# Patient Record
(demographics unavailable — no encounter records)

---

## 2024-11-25 NOTE — DISCUSSION/SUMMARY
[Antithrombotic therapy with ___] : antithrombotic therapy with  [unfilled] [Intensive Blood Pressure Control] : intensive blood pressure control [Lipid Lowering Therapy] : lipid lowering therapy [Patient encouraged to discuss with Primary MD] : I encouraged the patient to discuss these important issues with ~his/her~ primary care doctor [Goals and Counseling] : I have reviewed the goals of stroke risk factor modification. I counseled the patient on measures to reduce stroke risk, including the importance of medication compliance, risk factor control, exercise, healthy diet and avoidance of smoking. I reviewed stroke warning signs and symptoms and appropriate actions to take if such occur. [FreeTextEntry1] : 2024- On MRI brain, on my read I noticed extensive bilateral lacunar infarcts with extensive white matter/cerebral small vessel ischemic changes.  There are also multiple cerebral microbleed's in bilateral hemispheres more in the deep brain suggestive of hypertensive angiopathy  2017- MRA brain: No flow-limiting stenosis of the intracranial circulation. No MRA evidence of aneurysm. MRA neck: Less than 50% stenosis of the left proximal internal carotid artery as per NASCET criteria. No significant stenosis of the right common and internal carotid arteries.  Impression & Plan: 70 year old female with history of DM since 2009, no retinopathy, possible neuropathy, breast cancer s/p radiation therapy, hypertension who presents for follow up of chronic kidney disease and hypertension neurological exam is nonfocal  I have emphasized to her in very detail MRI findings that include multiple cerebral microbleed's and extensive periventricular white matter disease that she has to do her best to optimize her risk factors to decrease the risk of recurrent stroke and progression of small vessel cerebrovascular disease.

## 2024-11-25 NOTE — HISTORY OF PRESENT ILLNESS
[FreeTextEntry1] : Ms. Boyd is a 70-year-old woman, here for vascular neurology consultation. In 2017 she had a right MCA territory subacute infarct with - Left hemiparesis; left facial droop and slurred speech   No active / new clinical symptoms of new cerebrovascular symptoms.   Neurological ROS - negative except for HA

## 2024-11-25 NOTE — PHYSICAL EXAM
[General Appearance - Alert] : alert [General Appearance - In No Acute Distress] : in no acute distress [Oriented To Time, Place, And Person] : oriented to person, place, and time [Impaired Insight] : insight and judgment were intact [Affect] : the affect was normal [Person] : oriented to person [Place] : oriented to place [Time] : oriented to time [Concentration Intact] : normal concentrating ability [Visual Intact] : visual attention was ~T not ~L decreased [Naming Objects] : no difficulty naming common objects [Repeating Phrases] : no difficulty repeating a phrase [Writing A Sentence] : no difficulty writing a sentence [Fluency] : fluency intact [Comprehension] : comprehension intact [Reading] : reading intact [Past History] : adequate knowledge of personal past history [Cranial Nerves Optic (II)] : visual acuity intact bilaterally,  visual fields full to confrontation, pupils equal round and reactive to light [Cranial Nerves Oculomotor (III)] : extraocular motion intact [Cranial Nerves Trigeminal (V)] : facial sensation intact symmetrically [Cranial Nerves Facial (VII)] : face symmetrical [Cranial Nerves Vestibulocochlear (VIII)] : hearing was intact bilaterally [Cranial Nerves Glossopharyngeal (IX)] : tongue and palate midline [Cranial Nerves Accessory (XI - Cranial And Spinal)] : head turning and shoulder shrug symmetric [Cranial Nerves Hypoglossal (XII)] : there was no tongue deviation with protrusion [Motor Tone] : muscle tone was normal in all four extremities [Motor Strength] : muscle strength was normal in all four extremities [No Muscle Atrophy] : normal bulk in all four extremities [Sensation Tactile Decrease] : light touch was intact [Abnormal Walk] : normal gait [Balance] : balance was intact [2+] : Ankle jerk left 2+ [Sclera] : the sclera and conjunctiva were normal [PERRL With Normal Accommodation] : pupils were equal in size, round, reactive to light, with normal accommodation [Extraocular Movements] : extraocular movements were intact [Past-pointing] : there was no past-pointing [Tremor] : no tremor present [Plantar Reflex Right Only] : normal on the right [Plantar Reflex Left Only] : normal on the left

## 2025-01-02 NOTE — HISTORY OF PRESENT ILLNESS
[de-identified] : prior evaluation of neck to r/o metastatic adenopathy from  lower eyelid skin cancer.  denies pain, swelling, new lesions. no changes medically since last visit.  still follows with neurologist.  I have reviewed all old and new data and available images.

## 2025-01-02 NOTE — ASSESSMENT
[FreeTextEntry1] : will observe. no indication for any studies. to return earlier if any change. patient has been given the opportunity to ask questions, and all of the patient's questions have been answered to their satisfaction

## 2025-01-02 NOTE — PHYSICAL EXAM
[de-identified] : no palpable thyroid nodules [Laryngoscopy Performed] : laryngoscopy was performed, see procedure section for findings [Midline] : located in midline position [Normal] : orientation to person, place, and time: normal [de-identified] : well healed scar

## 2025-01-07 NOTE — PHYSICAL EXAM
[No Acute Distress] : no acute distress [Well Nourished] : well nourished [Well Developed] : well developed [Well-Appearing] : well-appearing [Normal Voice/Communication] : normal voice/communication [Normal Sclera/Conjunctiva] : normal sclera/conjunctiva [PERRL] : pupils equal round and reactive to light [EOMI] : extraocular movements intact [Normal Outer Ear/Nose] : the outer ears and nose were normal in appearance [Normal Oropharynx] : the oropharynx was normal [No JVD] : no jugular venous distention [Supple] : supple [No Respiratory Distress] : no respiratory distress  [No Accessory Muscle Use] : no accessory muscle use [Clear to Auscultation] : lungs were clear to auscultation bilaterally [Normal Rate] : normal rate  [Regular Rhythm] : with a regular rhythm [Normal S1, S2] : normal S1 and S2 [No Carotid Bruits] : no carotid bruits [No Edema] : there was no peripheral edema [No Extremity Clubbing/Cyanosis] : no extremity clubbing/cyanosis [Declined Breast Exam] : declined breast exam  [Soft] : abdomen soft [Normal Bowel Sounds] : normal bowel sounds [Declined Rectal Exam] : declined rectal exam [No CVA Tenderness] : no CVA  tenderness [No Spinal Tenderness] : no spinal tenderness [No Rash] : no rash [No Focal Deficits] : no focal deficits [Speech Grossly Normal] : speech grossly normal [Normal Affect] : the affect was normal [Alert and Oriented x3] : oriented to person, place, and time [de-identified] : No sinus tenderness [de-identified] : Thyromegaly; no stridor [de-identified] : R = 16 [de-identified] : Normal bilateral radial pulses; no cords [de-identified] : Declined [de-identified] : Ambulating with cane

## 2025-01-07 NOTE — REVIEW OF SYSTEMS
0 [Vision Problems] : vision problems [Joint Pain] : joint pain [Joint Stiffness] : joint stiffness [Back Pain] : back pain [Mole Changes] : mole changes [Skin Rash] : skin rash [Negative] : Heme/Lymph

## 2025-01-07 NOTE — HEALTH RISK ASSESSMENT
[Yes] : Yes [No] : In the past 12 months have you used drugs other than those required for medical reasons? No [No falls in past year] : Patient reported no falls in the past year [Patient refused screening] : Patient refused screening [With Patient/Caregiver] : , with patient/caregiver [Reviewed no changes] : Reviewed, no changes [Designated Healthcare Proxy] : Designated healthcare proxy [Name: ___] : Health Care Proxy's Name: [unfilled]  [Relationship: ___] : Relationship: [unfilled] [Former] : Former [de-identified] : Head and neck surgery, neurology, cardiology, ophthalmology, nephrology, oncology [de-identified] : None presently [de-identified] : Regular [AdvancecareDate] : 01/25

## 2025-01-07 NOTE — ASSESSMENT
[FreeTextEntry1] : Type 2 diabetes Has been well-controlled Endocrine follow-up Continue medications as per endocrine Continue to monitor microalbumin and A1c level Home glucose monitoring Podiatry follow-up Ophthalmology follow-up Continue weight control and ADA diet Flu vaccine given today  Hyperlipidemia Weight control/low-fat diet Continue statin therapy and daily Zetia Monitor lipid profile  Hypertension Blood pressure in fairly good range Low-sodium diet/weight control Continue losartan, amlodipine, spironolactone Nephrology follow-up  Osteoporosis Monitor bone density Off drug holiday and alendronate restarted by endocrine Weightbearing exercise as tolerated Vitamin D supplementation  Eyelid cancer Sees dermatology Sees head and neck surgery No recurrence noted  Full labs and urine testing done today She wishes to return in follow-up in 6 months for her annual physical and as needed She states that she will call me if her status changes or worsens or for any medical issues and return to be seen immediately All of the above was discussed in detail with her and all questions were answered She verbally confirmed understanding of all of the above and agreement with the above plan

## 2025-01-07 NOTE — HISTORY OF PRESENT ILLNESS
[Family Member] : family member [FreeTextEntry1] : Comes in for routine follow-up medical visit. [de-identified] : Reports that she is doing fairly well.  Excited about celebrating her 71st birthday later this month with a family dinner at [employer].  Continues to have joint issues.  Ambulates with cane.  Keeping up with multiple doctor visits.

## 2025-01-24 NOTE — REASON FOR VISIT
[Follow - Up] : a follow-up visit [DM Type 2] : DM Type 2 [Osteoporosis] : osteoporosis [Thyroid nodule/ MNG] : thyroid nodule/ MNG [Family Member] : family member

## 2025-01-25 NOTE — QUALITY MEASURES
What Type Of Note Output Would You Prefer (Optional)?: Bullet Format How Severe Are Your Spot(S)?: moderate Have Your Spot(S) Been Treated In The Past?: has not been treated Hpi Title: Evaluation of Skin Lesions [Visual inspection, sensory exam] : Foot exam, including visual inspection, sensory exam with mono filament, and pulse exam, was performed within the last 12 months [Nephrology Follow-Up] : patient is currently receiving treatment via nephrology follow-up

## 2025-01-25 NOTE — REVIEW OF SYSTEMS
[Joint Pain] : joint pain [Negative] : Integumentary [Fatigue] : no fatigue [Recent Weight Gain (___ Lbs)] : no recent weight gain [Recent Weight Loss (___ Lbs)] : no recent weight loss [Visual Field Defect] : no visual field defect [Polyuria] : no polyuria [Pain/Numbness of Digits] : no pain/numbness of digits [Polydipsia] : no polydipsia [Cold Intolerance] : no cold intolerance [Heat Intolerance] : no heat intolerance [Swelling] : no swelling

## 2025-01-25 NOTE — PHYSICAL EXAM
[Alert] : alert [No Acute Distress] : no acute distress [Normal Sclera/Conjunctiva] : normal sclera/conjunctiva [EOMI] : extra ocular movement intact [No LAD] : no lymphadenopathy [No Respiratory Distress] : no respiratory distress [Clear to Auscultation] : lungs were clear to auscultation bilaterally [Normal S1, S2] : normal S1 and S2 [Regular Rhythm] : with a regular rhythm [No Edema] : no peripheral edema [Pedal Pulses Normal] : the pedal pulses are present [Normal Bowel Sounds] : normal bowel sounds [Not Tender] : non-tender [Not Distended] : not distended [Soft] : abdomen soft [Normal Anterior Cervical Nodes] : no anterior cervical lymphadenopathy [No Clubbing, Cyanosis] : no clubbing  or cyanosis of the fingernails [No Rash] : no rash [Normal Reflexes] : deep tendon reflexes were 2+ and symmetric [Normal Affect] : the affect was normal [Normal Mood] : the mood was normal [de-identified] : prominent gland [Thyroid Not Enlarged] : the thyroid was not enlarged [Kyphosis] : no kyphosis present [de-identified] : last foot exam in June 2024 and sees podiatry

## 2025-01-25 NOTE — PHYSICAL EXAM
[Alert] : alert [No Acute Distress] : no acute distress [Normal Sclera/Conjunctiva] : normal sclera/conjunctiva [EOMI] : extra ocular movement intact [No LAD] : no lymphadenopathy [No Respiratory Distress] : no respiratory distress [Clear to Auscultation] : lungs were clear to auscultation bilaterally [Normal S1, S2] : normal S1 and S2 [Regular Rhythm] : with a regular rhythm [No Edema] : no peripheral edema [Pedal Pulses Normal] : the pedal pulses are present [Normal Bowel Sounds] : normal bowel sounds [Not Tender] : non-tender [Not Distended] : not distended [Soft] : abdomen soft [Normal Anterior Cervical Nodes] : no anterior cervical lymphadenopathy [No Clubbing, Cyanosis] : no clubbing  or cyanosis of the fingernails [No Rash] : no rash [Normal Reflexes] : deep tendon reflexes were 2+ and symmetric [Normal Affect] : the affect was normal [Normal Mood] : the mood was normal [de-identified] : prominent gland [Thyroid Not Enlarged] : the thyroid was not enlarged [Kyphosis] : no kyphosis present [de-identified] : last foot exam in June 2024 and sees podiatry

## 2025-01-25 NOTE — HISTORY OF PRESENT ILLNESS
[FreeTextEntry1] : 71 y.o. female with h/o osteoporosis, Type 2 DM, HTN, hyperlipidemia and thyroid nodules here for follow up visit.   No acute events since the last visit.  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 Had additional surgery.  Did have b/l knee replacement.   In regard to osteoporosis, had DEXA scan in September in 2015 left femoral neck -1.7, total hip -1.3, 1/3 radius 1.3, spine 0.5.   DEXA scan performed in February 2018 shows left femoral neck -2.5 with 9% decrease since 2015, total hip -2.0 with a 7.8% decrease, 1/3 radius is 1.2 which is stable.  DEXA scan performed in January 2020 showed left femoral neck -2.1% with + 7.5% improvement from prior in February 2018, 1/3 radius is 1.8 which is normal and improved from prior.  DEXA scan performed in 12/2022 showed left femoral neck -1.7% with + 10.2% improvement, total hip -1.8 which is stable, 1/3 radius is 1.6 which is normal and stable.   DEXA scan performed in 12/2023 shows left femoral neck -2.3 with 13.3% decrease, total hip -1.8 which is stable, 1/3 radius is 1.7 which is normal and stable.   She was taking Alendronate 70 mg po weekly from 2/2018 until 12/2022. Had 3 teeth extracted in April 2019 so Alendronate was on hold from April 2019 and resumed back in July 2019. Had another tooth extraction in December 2019. Stated that she held Fosamax for 2 weeks prior to the extraction and then resumed again. She was on a drug holiday since 12/2022 until 12/2023.   She restarted Alendronate 70 mg po weekly in December 2023. No thigh pain or jaw pain. No exercise. No recent falls or fractures. UTD with dentist and no issues now.    In regard to Type 2 DM, taking Trulicity 1.5 mg SQ weekly and tolerating. Stopped Metformin 500 mg BID. Not FS monitoring. Weight is stable since last visit. Reports less snacking now.   UTD with ophthalmology (9/12/24). No retinopathy. Had right cataract surgery in June 2018. Had left cataract surgery in 2019. No proteinuria. Does have CKD and sees nephrology. Off ACE-I because of cough and taking Losartan 100 mg daily. No new foot complaints. Sees podiatry.   History of stroke (presented with left sided weakness and slurred speech), diagnosed in 1/2017, was at [facility] on 1/6/17.   In regard to thyroid disease, thyroid ultrasound in February 2017 showed stable left colloid cyst 0.4 cm. Thyroid ultrasound in January 2020 shows 2 left stable colloid cysts 0.7 cm and 0.5 cm.    Thyroid ultrasound on 7/31/24 showed stable multiple b/l subcentimeter  (TR1/2) nodules.   No neck complaints. No hypothyroid or hyperthyroid complaints.  [Hypoglycemia] : not hypoglycemic

## 2025-01-25 NOTE — ASSESSMENT
[Carbohydrate Consistent Diet] : carbohydrate consistent diet [Diabetes Foot Care] : diabetes foot care [Long Term Vascular Complications] : long term vascular complications of diabetes [Bisphosphonate Therapy] : Risks  and benefits of bisphosphonate therapy were  discussed with the patient including gastroesophageal irritation, osteonecrosis of the jaw, and atypical femur fractures, and acute phase reaction [Incretin Mimetic Therapy] : Risks and benefits of incretin mimetic therapy were discussed with the patient including nauseau, pancreatitis and potential risk of medullary thyroid cancer [Bisphosphonates] : The patient was instructed to take bisphosphonates on an empty stomach with a full glass of water,and wait at least 30 minutes before eating or lying down [FreeTextEntry1] : 71 y.o. female with h/o Type 2 DM, HTN, hyperlipidemia, thyroid nodule and osteoporosis.  1. Type 2 DM- Good control with Hba1c of 6.1% this month. Encouraged a carbohydrate consistent diet and exercise. Will remain off Metformin 500 mg BID for now. Will continue Trulicity 1.5 mg SQ weekly. Unable to get Ozempic secondary to insurance. Reviewed risks and benefits of GLP-1 Tara including GI side effects, pancreatitis and MTC. UTD with ophthalmology and follows with podiatry. UTD with CMP and urine alb/cr ratio in January 2025.   2. HTN- BP is at goal and will continue medications including ARB  3. Hyperlipidemia- Lipids are at goal and will continue high intensity statin and Zetia. Recommend low fat diet. She follows with cardiology.  4. Thyroid nodule- Patient is euthyroid. Stable left cysts on thyroid ultrasound in July 2024. Will continue to monitor and will repeat thyroid ultrasound in several years.  5. Osteoporosis- DEXA scan performed in 12/2023 shows left femoral neck -2.3 with 13.3% decrease, total hip -1.8 which is stable, 1/3 radius is 1.7 which is normal and stable. Completed one-year drug holiday. Given increased risk of fracture, will continue Alendronate 70 mg po weekly. Encouraged weight bearing activity. Discussed appropriate calcium and vitamin D intake. Discussed importance of dental care. Will repeat DEXA in 12/2025.    Follow up in 6 months.  .

## 2025-01-25 NOTE — HISTORY OF PRESENT ILLNESS
[FreeTextEntry1] : 71 y.o. female with h/o osteoporosis, Type 2 DM, HTN, hyperlipidemia and thyroid nodules here for follow up visit.   No acute events since the last visit.  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 Had additional surgery.  Did have b/l knee replacement.   In regard to osteoporosis, had DEXA scan in September in 2015 left femoral neck -1.7, total hip -1.3, 1/3 radius 1.3, spine 0.5.   DEXA scan performed in February 2018 shows left femoral neck -2.5 with 9% decrease since 2015, total hip -2.0 with a 7.8% decrease, 1/3 radius is 1.2 which is stable.  DEXA scan performed in January 2020 showed left femoral neck -2.1% with + 7.5% improvement from prior in February 2018, 1/3 radius is 1.8 which is normal and improved from prior.  DEXA scan performed in 12/2022 showed left femoral neck -1.7% with + 10.2% improvement, total hip -1.8 which is stable, 1/3 radius is 1.6 which is normal and stable.   DEXA scan performed in 12/2023 shows left femoral neck -2.3 with 13.3% decrease, total hip -1.8 which is stable, 1/3 radius is 1.7 which is normal and stable.   She was taking Alendronate 70 mg po weekly from 2/2018 until 12/2022. Had 3 teeth extracted in April 2019 so Alendronate was on hold from April 2019 and resumed back in July 2019. Had another tooth extraction in December 2019. Stated that she held Fosamax for 2 weeks prior to the extraction and then resumed again. She was on a drug holiday since 12/2022 until 12/2023.   She restarted Alendronate 70 mg po weekly in December 2023. No thigh pain or jaw pain. No exercise. No recent falls or fractures. UTD with dentist and no issues now.    In regard to Type 2 DM, taking Trulicity 1.5 mg SQ weekly and tolerating. Stopped Metformin 500 mg BID. Not FS monitoring. Weight is stable since last visit. Reports less snacking now.   UTD with ophthalmology (9/12/24). No retinopathy. Had right cataract surgery in June 2018. Had left cataract surgery in 2019. No proteinuria. Does have CKD and sees nephrology. Off ACE-I because of cough and taking Losartan 100 mg daily. No new foot complaints. Sees podiatry.   History of stroke (presented with left sided weakness and slurred speech), diagnosed in 1/2017, was at [facility] on 1/6/17.   In regard to thyroid disease, thyroid ultrasound in February 2017 showed stable left colloid cyst 0.4 cm. Thyroid ultrasound in January 2020 shows 2 left stable colloid cysts 0.7 cm and 0.5 cm.    Thyroid ultrasound on 7/31/24 showed stable multiple b/l subcentimeter  (TR1/2) nodules.   No neck complaints. No hypothyroid or hyperthyroid complaints.  [Hypoglycemia] : not hypoglycemic

## 2025-01-25 NOTE — DATA REVIEWED
[FreeTextEntry1] : Labs\par  10/13/22\par  BUN/cr 22/1.42\par  calcium 9.2\par  Hba1c 6.2%\par  H/H 12.7/41.6\par

## 2025-02-19 NOTE — PHYSICAL EXAM
[General Appearance - Alert] : alert [General Appearance - In No Acute Distress] : in no acute distress [General Appearance - Well Nourished] : well nourished [General Appearance - Well Developed] : well developed [Outer Ear] : the ears and nose were normal in appearance [Neck Appearance] : the appearance of the neck was normal [] : the neck was supple [Neck Cervical Mass (___cm)] : no neck mass was observed [Jugular Venous Distention Increased] : there was no jugular-venous distention [Respiration, Rhythm And Depth] : normal respiratory rhythm and effort [Auscultation Breath Sounds / Voice Sounds] : lungs were clear to auscultation bilaterally [Heart Rate And Rhythm] : heart rate was normal and rhythm regular [Edema] : there was no peripheral edema [Bowel Sounds] : normal bowel sounds [Abdomen Soft] : soft [Involuntary Movements] : no involuntary movements were seen [Oriented To Time, Place, And Person] : oriented to person, place, and time

## 2025-02-19 NOTE — ASSESSMENT
[FreeTextEntry1] : ASSESSMENT: 71 year old F with history of DM, HTN, breast cancer, CKD who presents for follow up of primary aldosteronism and CKD. Her CKD is likely related to DMII and APOl1 related nephropathy given her FH of renal disease and on dialysis.     #Chronic Kidney Disease (CKD), stage 3, due to DN + component of APOl1 Mutation, with baseline creatinine ~ 1.5, non-proteinuric, Not on ACE,  , SGLT2i, GLP1. No signs of uremia.  No history of   nephrolithiasis, obstruction, thrombosis, NSAIDs, PPIs, herbal meds, kidney biopsy, dialysis, transplant, or recent IV iodinated contrast.    #CV: History of HTN, related to hyperaldosteronism, blood pressure (BP) appears suboptimal control however reports white coat HTN. No recent adjustments to antihypertensive regimen. Reviewed blood pressure management including goal BP above 100/60 but less than 140/90 including controlling modifiable factors such as exercise/physical therapy, attaining optimal BMI, maintaining a diet low in sodium and cholesterol and avoiding medications that such as OTC decongestants.  No LE edema. No history of HILL,  , JACE, endocrine disorders, CAD, HF. HLD-on statin.   #Acid-Base: no evidence of acid/base disorder from renal dysfunction; CO2 within range.   #Mineral-Bone Metabolism: will trend labs (Calcium, Phosphorus, Vitamin D 25-Hydroxy, intact PTH).   #Hematology: Hemoglobin stable; Platelets stable; no history of iron deficient anemia, no history of BORIS administration.   #Patient Education: I discussed during this visit or reiterated from previous visits the meaning and implications of the CKD diagnosis. I have explained the diagnostic and therapeutic approach to this disease including general prognostic information. Education done regarding the importance of preventive measures such as adequate oral hydration, healthy nutrition, weight, blood pressure and glucose management as well as avoidance of iodinated IV contrast as able and NSAIDs such as   ibuprofen (Advil, Motrin), naproxen (Aleve), celecoxib (Celebrex), diclofenac (Voltaren), etodolac (Lodine), indomethacin (Indocin),  ketolorac (Toradol)  meloxicam (Mobic) and/or piroxicam (Deldene).  PLAN: - continue current antihypertensive regimen - if sBP >140 then will increase spironlactone to 50 mg daily  - no need for renal diet, drink to thirst - avoid NSAIDs and nephrotoxins, including iodinated IV contrast as able - dose any new medications for current eGFR - follow up urine results from today - follow up with Endocrinology, given recent approval of GLP1 for renoprotective measures maybe with that DM and obesity she may qualify for coverage - follow up visit, on-site, with Nephrology NP in 2 weeks with phone call, 3 months follow up with labs and Nephrology MD in 6 months, or sooner as needed   Patient verbalized understanding of above, they will call our office if any issues or concerns arise. --- Catia Peña NP ([place])  Nephrology - Nurse Practitioner [facility]  [address] [place] [address], [address] P: [phone] | F: [phone] | Teams | [place]  ---

## 2025-02-19 NOTE — HISTORY OF PRESENT ILLNESS
[FreeTextEntry1] : initial visit hpi 8/19/24 Ms. KHOURY is a 71 year old female with history of DM since 2009, no retinopathy, possible neuropathy, breast cancer s/p radiation therapy, hypertension who presents for follow up of chronic kidney disease and hypertension after long hiatus. She was last seen here with my colleague Dr Loyola in 2016. at that time, crt was stable and GFR>50 and did not follow up. She was diagnosed with primary hyperaldosteronism managed on Aldactone. Not interested in surgery. She returns today feeling well. Blood pressures are well controlled typically 120-140. No hypertensive or hypotensive episodes. Has some symptoms of dizziness associated with hypoglycemia. Otherwise denies shortness of breath, fever, chills, chest pain or edema.  She has had stable decrease in GFR to 40s now since last 10 years. She is on max ARB and has no proteinuria She does have FH of renal disease several members on dialysis. She is AA of heritage Most recent crt in 1.3-1.4 range Meds reviewed.  2/19/25 Last visit: 8/19/24 Labs: 1/7/25 Patient seen and examined, med list reviewed and updated Vital signs stable; No acute distress, no new complaints or concerns. Normal appetite, eats a moderatley high salt diet hydrates well. Patient denies weight changes, headache, dizziness, CP, SOB, abd pain, n/v/d, hematuria, dysuria, foamy urine, fever or chills. 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 Likely cause of her HTN and CKD. She also has few VUS- LAMB2, WDR19, PTPRO and FREM1--all AR and heterozygous

## 2025-05-23 NOTE — HISTORY OF PRESENT ILLNESS
[FreeTextEntry1] : initial visit hpi 8/19/24 Ms. KHOURY is a 71 year old female with history of DM since 2009, no retinopathy, possible neuropathy, breast cancer s/p radiation therapy, hypertension who presents for follow up of chronic kidney disease and hypertension after long hiatus. She was last seen here with my colleague Dr Loyola in 2016. at that time, crt was stable and GFR>50 and did not follow up. She was diagnosed with primary hyperaldosteronism managed on Aldactone. Not interested in surgery. She returns today feeling well. Blood pressures are well controlled typically 120-140. No hypertensive or hypotensive episodes. Has some symptoms of dizziness associated with hypoglycemia. Otherwise denies shortness of breath, fever, chills, chest pain or edema.  She has had stable decrease in GFR to 40s now since last 10 years. She is on max ARB and has no proteinuria She does have FH of renal disease several members on dialysis. [facility] Most recent crt in 1.3-1.4 range Meds reviewed.  2/19/25 Last visit: 8/19/24 Labs: 1/7/25 Patient seen and examined, med list reviewed and updated Vital signs stable; No acute distress, no new complaints or concerns. Normal appetite, eats a moderatley high salt diet hydrates well. Patient denies weight changes, headache, dizziness, CP, SOB, abd pain, n/v/d, hematuria, dysuria, foamy urine, fever or chills. 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 Likely cause of her HTN and CKD. She also has few VUS- LAMB2, WDR19, PTPRO and FREM1--all AR and heterozygous   5/23/25 Last visit:    2/19/25     Labs:  going in july Patient seen and examined, med list reviewed and updated. Vital signs stable. Checks BP at home, readings generally 120s/130s. Since last visit: no new events. Has some lower back pain on right side radiating down her leg, was evaluated with MRI and recommended for PT but instead she does home pt by herself. No acute distress, no new complaints or concerns. Normal appetite, hydrates well. No LE edema. Patient denies weight changes, headache, dizziness, CP, SOB, abd pain, n/v/d, hematuria, dysuria, foamy urine, fever or chills. GLP was not covered by insurance.

## 2025-05-23 NOTE — ASSESSMENT
[FreeTextEntry1] : 71 year old F with history of DM, HTN, breast cancer, CKD who presents for follow up of primary aldosteronism and CKD. Her CKD is likely related to DMII and APOl1 related nephropathy given her FH of renal disease and on dialysis.     #Chronic Kidney Disease (CKD), stage 3, due to DN + component of APOl1 Mutation, with baseline creatinine ~ 1.5, non-proteinuric, Not on ACE,  , SGLT2i, GLP1. No signs of uremia.  No history of   nephrolithiasis, obstruction, thrombosis, NSAIDs, PPIs, herbal meds, kidney biopsy, dialysis, transplant, or recent IV iodinated contrast.   - labs in the summer  - avoid NSAIDs  - consider GLP for DM, CKD and obesity  #CV: History of HTN, related to hyperaldosteronism, blood pressure (BP) appears controled reports white coat HTN. No recent adjustments to antihypertensive regimen. Reviewed blood pressure management including goal BP above 100/60 but less than 140/90 including controlling modifiable factors such as exercise/physical therapy, attaining optimal BMI, maintaining a diet low in sodium and cholesterol and avoiding medications that such as OTC decongestants.  No LE edema. No history of HILL,  , JACE, endocrine disorders, CAD, HF. HLD-on statin.  - continue current antihypertensive regimen  #Acid-Base: no evidence of acid/base disorder from renal dysfunction; CO2 within range.   #Mineral-Bone Metabolism: will trend labs (Calcium, Phosphorus, Vitamin D 25-Hydroxy, intact PTH).   #Hematology: Hemoglobin stable; Platelets stable; no history of iron deficient anemia, no history of BORIS administration.   #Patient Education: I discussed during this visit or reiterated from previous visits the meaning and implications of the CKD diagnosis. I have explained the diagnostic and therapeutic approach to this disease including general prognostic information. Education done regarding the importance of preventive measures such as adequate oral hydration, healthy nutrition, weight, blood pressure and glucose management as well as avoidance of iodinated IV contrast as able and NSAIDs such as   ibuprofen (Advil, Motrin), naproxen (Aleve), celecoxib (Celebrex), diclofenac (Voltaren), etodolac (Lodine), indomethacin (Indocin),  ketolorac (Toradol)  meloxicam (Mobic) and/or piroxicam (Deldene).  PLAN:  - follow up visit, on-site, with Nephrology MD in 6 months , or sooner as needed   Patient verbalized understanding of above, they will call our office if any issues or concerns arise. --- Catia Peña NP ([place]oush)  [facility]  P: [phone] | F: [phone] ---

## 2025-07-08 NOTE — HEALTH RISK ASSESSMENT
[Good] : ~his/her~  mood as  good [Monthly or less (1 pt)] : Monthly or less (1 point) [1 or 2 (0 pts)] : 1 or 2 (0 points) [Never (0 pts)] : Never (0 points) [No] : In the past 12 months have you used drugs other than those required for medical reasons? No [No falls in past year] : Patient reported no falls in the past year [0] : 2) Feeling down, depressed, or hopeless: Not at all (0) [PHQ-2 Negative - No further assessment needed] : PHQ-2 Negative - No further assessment needed [PHQ-9 Negative - No further assessment needed] : PHQ-9 Negative - No further assessment needed [Former] : Former [20 or more] : 20 or more [> 15 Years] : > 15 Years [NO] : No [Patient declined Retinal Exam] : Patient declined Retinal Exam. [HIV Test offered] : HIV Test offered [Hepatitis C test offered] : Hepatitis C test offered [With Family] : lives with family [# of Members in Household ___] :  household currently consist of [unfilled] member(s) [Retired] : retired [Less Than High School] : less than high school [] :  [# Of Children ___] : has [unfilled] children [Feels Safe at Home] : Feels safe at home [Fully functional (bathing, dressing, toileting, transferring, walking, feeding)] : Fully functional (bathing, dressing, toileting, transferring, walking, feeding) [Fully functional (using the telephone, shopping, preparing meals, housekeeping, doing laundry, using] : Fully functional and needs no help or supervision to perform IADLs (using the telephone, shopping, preparing meals, housekeeping, doing laundry, using transportation, managing medications and managing finances) [Smoke Detector] : smoke detector [Carbon Monoxide Detector] : carbon monoxide detector [Seat Belt] :  uses seat belt [Sunscreen] : uses sunscreen [With Patient/Caregiver] : , with patient/caregiver [Designated Healthcare Proxy] : Designated healthcare proxy [Name: ___] : Health Care Proxy's Name: [unfilled]  [Relationship: ___] : Relationship: [unfilled] [Fair] : ~his/her~ current health as fair  [Little interest or pleasure doing things] : 1) Little interest or pleasure doing things [Feeling down, depressed, or hopeless] : 2) Feeling down, depressed, or hopeless [None] : Patient does not have any barriers to medication adherence [Yes] : Reviewed medication list for presence of high-risk medications. [Opioids] : opioids [FreeTextEntry1] : see HPI [de-identified] : neuro, cardiology, ophtho, oncology, dentist,renal;endocrine, pain management, orthopedics, head and neck surgery, GYN, podiatry [Audit-CScore] : 1 [de-identified] : scant exercise [de-identified] : regular [[id number]] : 0 [EyeExamDate] : 05/25 [HIV test declined] : HIV test declined [Hepatitis C test declined] : Hepatitis C test declined [Change in mental status noted] : No change in mental status noted [Language] : denies difficulty with language [Behavior] : denies difficulty with behavior [Learning/Retaining New Information] : denies difficulty learning/retaining new information [Handling Complex Tasks] : denies difficulty handling complex tasks [Reasoning] : denies difficulty with reasoning [Spatial Ability and Orientation] : denies difficulty with spatial ability and orientation [Reports changes in hearing] : Reports no changes in hearing [Reports changes in vision] : Reports no changes in vision [Reports changes in dental health] : Reports no changes in dental health [Travel to Developing Areas] : does not  travel to developing areas [TB Exposure] : is not being exposed to tuberculosis [Caregiver Concerns] : does not have caregiver concerns [MammogramDate] : 12/24 [PapSmearDate] : 12/24 [BoneDensityDate] : 12/23 [ColonoscopyDate] : 04/23 [AdvancecareDate] : 07/25

## 2025-07-08 NOTE — PHYSICAL EXAM
[No Acute Distress] : no acute distress [Well Nourished] : well nourished [Well Developed] : well developed [Well-Appearing] : well-appearing [Normal Voice/Communication] : normal voice/communication [Normal Sclera/Conjunctiva] : normal sclera/conjunctiva [PERRL] : pupils equal round and reactive to light [EOMI] : extraocular movements intact [Normal Outer Ear/Nose] : the outer ears and nose were normal in appearance [Normal Oropharynx] : the oropharynx was normal [Normal TMs] : both tympanic membranes were normal [No JVD] : no jugular venous distention [Supple] : supple [No Lymphadenopathy] : no lymphadenopathy [No Respiratory Distress] : no respiratory distress  [Clear to Auscultation] : lungs were clear to auscultation bilaterally [No Accessory Muscle Use] : no accessory muscle use [Normal Rate] : normal rate  [Regular Rhythm] : with a regular rhythm [Normal S1, S2] : normal S1 and S2 [No Carotid Bruits] : no carotid bruits [Pedal Pulses Present] : the pedal pulses are present [No Edema] : there was no peripheral edema [No Extremity Clubbing/Cyanosis] : no extremity clubbing/cyanosis [Declined Breast Exam] : declined breast exam  [Soft] : abdomen soft [Non Tender] : non-tender [Non-distended] : non-distended [No Masses] : no abdominal mass palpated [No HSM] : no HSM [Normal Bowel Sounds] : normal bowel sounds [Declined Rectal Exam] : declined rectal exam [Normal Supraclavicular Nodes] : no supraclavicular lymphadenopathy [Normal Axillary Nodes] : no axillary lymphadenopathy [Normal Posterior Cervical Nodes] : no posterior cervical lymphadenopathy [Normal Anterior Cervical Nodes] : no anterior cervical lymphadenopathy [No CVA Tenderness] : no CVA  tenderness [No Spinal Tenderness] : no spinal tenderness [Grossly Normal Strength/Tone] : grossly normal strength/tone [No Rash] : no rash [Normal Gait] : normal gait [No Focal Deficits] : no focal deficits [Speech Grossly Normal] : speech grossly normal [Normal Affect] : the affect was normal [Alert and Oriented x3] : oriented to person, place, and time [Memory Grossly Normal] : memory grossly normal [Normal Mood] : the mood was normal [Normal Insight/Judgement] : insight and judgment were intact [de-identified] : no ST [de-identified] : No stridor [de-identified] : R=16 [de-identified] : no cords; normal radial pulses [de-identified] : as per GYN [de-identified] : Bilateral TKR scars [de-identified] : As per endocrine/podiatry

## 2025-07-08 NOTE — ASSESSMENT
[FreeTextEntry1] : See health maintenance assessment and plan outlined below. In addition: Full labs and urine testing done today Had bone density with endocrine December 2023 = will be due for follow-up testing December 2025 Podiatry f/u 2025 for diabetic foot care Ortho f/u for hip and back pain 2025///consider pain management evaluation Saw Dr. Sanjeev PICHARDO and had colonoscopy April 2023 Saw GYN December 2024 = follow-up there 2025 Plans to see Dr. Palleschi/oncology for her breast cancer care 2025 Had mammography December 2024 = will be due for follow-up breast imaging December 2025 Renal follow-up for CKD 2025 Continue meds, diet, exercise as outlined above EKG declined here today as she wishes to do with cardiology Cardiology f/u with Dr. Lisker 2025 = last seen September 2024 Had chest x-ray July 2024 Consider PFTs 2025 given smoking history Endocrine f/u 2025///does thyroid ultrasound with endocrine Vaccines d/w patient To see derm 2025 for full skin check To see ophthalmology 2025 for diabetic eye exam Dental follow-up 2025 Neuro f/u 2025  Surgical follow-up with Dr. Pedroza 2025 RTC for annual physical RTC 6 months and as needed To call for any medical/pulmonary issues or if her status worsens/changes and to return to be seen immediately All of the above was discussed in detail with the patient and her daughter and all questions were answered They verbally confirmed understanding of all of the above and agreement with the above plan Will complete DMV form as requested [Vaccines Reviewed] : Immunizations reviewed today. Please see immunization details in the vaccine log within the immunization flowsheet.

## 2025-07-08 NOTE — PHYSICAL EXAM
[No Acute Distress] : no acute distress [Well Nourished] : well nourished [Well Developed] : well developed [Well-Appearing] : well-appearing [Normal Voice/Communication] : normal voice/communication [Normal Sclera/Conjunctiva] : normal sclera/conjunctiva [PERRL] : pupils equal round and reactive to light [EOMI] : extraocular movements intact [Normal Outer Ear/Nose] : the outer ears and nose were normal in appearance [Normal Oropharynx] : the oropharynx was normal [Normal TMs] : both tympanic membranes were normal [No JVD] : no jugular venous distention [Supple] : supple [No Lymphadenopathy] : no lymphadenopathy [No Respiratory Distress] : no respiratory distress  [Clear to Auscultation] : lungs were clear to auscultation bilaterally [No Accessory Muscle Use] : no accessory muscle use [Normal Rate] : normal rate  [Regular Rhythm] : with a regular rhythm [Normal S1, S2] : normal S1 and S2 [No Carotid Bruits] : no carotid bruits [Pedal Pulses Present] : the pedal pulses are present [No Edema] : there was no peripheral edema [No Extremity Clubbing/Cyanosis] : no extremity clubbing/cyanosis [Declined Breast Exam] : declined breast exam  [Soft] : abdomen soft [Non Tender] : non-tender [Non-distended] : non-distended [No Masses] : no abdominal mass palpated [No HSM] : no HSM [Normal Bowel Sounds] : normal bowel sounds [Declined Rectal Exam] : declined rectal exam [Normal Supraclavicular Nodes] : no supraclavicular lymphadenopathy [Normal Axillary Nodes] : no axillary lymphadenopathy [Normal Posterior Cervical Nodes] : no posterior cervical lymphadenopathy [Normal Anterior Cervical Nodes] : no anterior cervical lymphadenopathy [No CVA Tenderness] : no CVA  tenderness [No Spinal Tenderness] : no spinal tenderness [Grossly Normal Strength/Tone] : grossly normal strength/tone [No Rash] : no rash [Normal Gait] : normal gait [No Focal Deficits] : no focal deficits [Speech Grossly Normal] : speech grossly normal [Normal Affect] : the affect was normal [Alert and Oriented x3] : oriented to person, place, and time [Memory Grossly Normal] : memory grossly normal [Normal Mood] : the mood was normal [Normal Insight/Judgement] : insight and judgment were intact [de-identified] : no ST [de-identified] : No stridor [de-identified] : R=16 [de-identified] : no cords; normal radial pulses [de-identified] : as per GYN [de-identified] : Bilateral TKR scars [de-identified] : As per endocrine/podiatry

## 2025-07-08 NOTE — HISTORY OF PRESENT ILLNESS
[Family Member] : family member [FreeTextEntry1] : Comes in for annual physical. [de-identified] : Has had covid. Doing okay. Having left hip and back pain.  Having issues with walking and in chronic pain.

## 2025-07-08 NOTE — REVIEW OF SYSTEMS
[Vision Problems] : vision problems [Cough] : cough [Joint Pain] : joint pain [Joint Stiffness] : joint stiffness [Back Pain] : back pain [Unsteady Walking] : ataxia [Negative] : Heme/Lymph

## 2025-07-08 NOTE — HEALTH RISK ASSESSMENT
[Good] : ~his/her~  mood as  good [Monthly or less (1 pt)] : Monthly or less (1 point) [1 or 2 (0 pts)] : 1 or 2 (0 points) [Never (0 pts)] : Never (0 points) [No] : In the past 12 months have you used drugs other than those required for medical reasons? No [No falls in past year] : Patient reported no falls in the past year [0] : 2) Feeling down, depressed, or hopeless: Not at all (0) [PHQ-2 Negative - No further assessment needed] : PHQ-2 Negative - No further assessment needed [PHQ-9 Negative - No further assessment needed] : PHQ-9 Negative - No further assessment needed [Former] : Former [20 or more] : 20 or more [> 15 Years] : > 15 Years [NO] : No [Patient declined Retinal Exam] : Patient declined Retinal Exam. [HIV Test offered] : HIV Test offered [Hepatitis C test offered] : Hepatitis C test offered [With Family] : lives with family [# of Members in Household ___] :  household currently consist of [unfilled] member(s) [Retired] : retired [Less Than High School] : less than high school [] :  [# Of Children ___] : has [unfilled] children [Feels Safe at Home] : Feels safe at home [Fully functional (bathing, dressing, toileting, transferring, walking, feeding)] : Fully functional (bathing, dressing, toileting, transferring, walking, feeding) [Fully functional (using the telephone, shopping, preparing meals, housekeeping, doing laundry, using] : Fully functional and needs no help or supervision to perform IADLs (using the telephone, shopping, preparing meals, housekeeping, doing laundry, using transportation, managing medications and managing finances) [Smoke Detector] : smoke detector [Carbon Monoxide Detector] : carbon monoxide detector [Seat Belt] :  uses seat belt [Sunscreen] : uses sunscreen [With Patient/Caregiver] : , with patient/caregiver [Designated Healthcare Proxy] : Designated healthcare proxy [Name: ___] : Health Care Proxy's Name: [unfilled]  [Relationship: ___] : Relationship: [unfilled] [Fair] : ~his/her~ current health as fair  [Little interest or pleasure doing things] : 1) Little interest or pleasure doing things [Feeling down, depressed, or hopeless] : 2) Feeling down, depressed, or hopeless [None] : Patient does not have any barriers to medication adherence [Yes] : Reviewed medication list for presence of high-risk medications. [Opioids] : opioids [FreeTextEntry1] : see HPI [de-identified] : neuro, cardiology, ophtho, oncology, dentist,renal;endocrine, pain management, orthopedics, head and neck surgery, GYN, podiatry [Audit-CScore] : 1 [de-identified] : scant exercise [de-identified] : regular [[id number]] : 0 [EyeExamDate] : 05/25 [HIV test declined] : HIV test declined [Hepatitis C test declined] : Hepatitis C test declined [Change in mental status noted] : No change in mental status noted [Language] : denies difficulty with language [Behavior] : denies difficulty with behavior [Learning/Retaining New Information] : denies difficulty learning/retaining new information [Handling Complex Tasks] : denies difficulty handling complex tasks [Reasoning] : denies difficulty with reasoning [Spatial Ability and Orientation] : denies difficulty with spatial ability and orientation [Reports changes in hearing] : Reports no changes in hearing [Reports changes in vision] : Reports no changes in vision [Reports changes in dental health] : Reports no changes in dental health [Travel to Developing Areas] : does not  travel to developing areas [TB Exposure] : is not being exposed to tuberculosis [Caregiver Concerns] : does not have caregiver concerns [MammogramDate] : 12/24 [PapSmearDate] : 12/24 [BoneDensityDate] : 12/23 [ColonoscopyDate] : 04/23 [AdvancecareDate] : 07/25

## 2025-07-08 NOTE — PAST MEDICAL HISTORY
[Postmenopausal] : postmenopausal [Menarche Age ____] : age at menarche was [unfilled] [Menopause Age____] : age at menopause was [unfilled] [Total Preg ___] : G[unfilled] [Live Births ___] : P[unfilled]  [Full Term ___] : Full Term: [unfilled] [Abortions ___] : Abortions:[unfilled] [Living ___] : Living: [unfilled]

## 2025-07-26 NOTE — PHYSICAL EXAM
[Alert] : alert [No Acute Distress] : no acute distress [Normal Sclera/Conjunctiva] : normal sclera/conjunctiva [EOMI] : extra ocular movement intact [No LAD] : no lymphadenopathy [Thyroid Not Enlarged] : the thyroid was not enlarged [No Respiratory Distress] : no respiratory distress [Clear to Auscultation] : lungs were clear to auscultation bilaterally [Normal S1, S2] : normal S1 and S2 [Regular Rhythm] : with a regular rhythm [No Edema] : no peripheral edema [Normal Bowel Sounds] : normal bowel sounds [Not Tender] : non-tender [Not Distended] : not distended [Soft] : abdomen soft [Normal Anterior Cervical Nodes] : no anterior cervical lymphadenopathy [No Clubbing, Cyanosis] : no clubbing  or cyanosis of the fingernails [No Rash] : no rash [Normal Reflexes] : deep tendon reflexes were 2+ and symmetric [Normal Affect] : the affect was normal [Normal Mood] : the mood was normal [de-identified] : last foot exam in June 2024 and sees podiatry [Kyphosis] : no kyphosis present [Right foot was examined, including] : right foot ~C was examined, including visual inspection with sensory and pulse exams [Left foot was examined, including] : left foot ~C was examined, including visual inspection with sensory and pulse exams [Normal] : normal [2+] : 2+ in the dorsalis pedis [Diminished Throughout Both Feet] : normal tactile sensation with monofilament testing throughout both feet

## 2025-07-26 NOTE — HISTORY OF PRESENT ILLNESS
[FreeTextEntry1] : 71 y.o. female with h/o osteoporosis, Type 2 DM, HTN, hyperlipidemia and thyroid nodules here for follow up visit.   No acute events since the last visit.  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 Had additional surgery.  Did have b/l knee replacement.   In regard to osteoporosis, had DEXA scan in September in 2015 left femoral neck -1.7, total hip -1.3, 1/3 radius 1.3, spine 0.5.   DEXA scan performed in February 2018 shows left femoral neck -2.5 with 9% decrease since 2015, total hip -2.0 with a 7.8% decrease, 1/3 radius is 1.2 which is stable.  DEXA scan performed in January 2020 showed left femoral neck -2.1% with + 7.5% improvement from prior in February 2018, 1/3 radius is 1.8 which is normal and improved from prior.  DEXA scan performed in 12/2022 showed left femoral neck -1.7% with + 10.2% improvement, total hip -1.8 which is stable, 1/3 radius is 1.6 which is normal and stable.  DEXA scan performed in 12/2023 shows left femoral neck -2.3 with 13.3% decrease, total hip -1.8 which is stable, 1/3 radius is 1.7 which is normal and stable.   She was taking Alendronate 70 mg po weekly from 2/2018 until 12/2022. Had 3 teeth extracted in April 2019 so Alendronate was on hold from April 2019 and resumed back in July 2019. Had another tooth extraction in December 2019. Stated that she held Fosamax for 2 weeks prior to the extraction and then resumed again. She was on a drug holiday since 12/2022 until 12/2023.   She restarted Alendronate 70 mg po weekly in December 2023. No thigh pain or jaw pain. No exercise. No recent falls or fractures. UTD with dentist and no issues now.   She is getting back pain radiating down left leg.    In regard to Type 2 DM, taking Trulicity 1.5 mg SQ weekly and tolerating. Stopped Metformin 500 mg BID. Not FS monitoring. Weight is stable since last visit. Reports less snacking now.   UTD with ophthalmology (9/12/24). No retinopathy. Had right cataract surgery in June 2018. Had left cataract surgery in 2019. No proteinuria. Does have CKD and sees nephrology. Off ACE-I because of cough and taking Losartan 100 mg daily. No new foot complaints. Sees podiatry.   History of stroke (presented with left sided weakness and slurred speech), diagnosed in 1/2017, was at [facility] on 1/6/17.   In regard to thyroid disease, thyroid ultrasound in February 2017 showed stable left colloid cyst 0.4 cm.  Thyroid ultrasound in January 2020 shows 2 left stable colloid cysts 0.7 cm and 0.5 cm.    Thyroid ultrasound on 7/31/24 showed stable multiple b/l subcentimeter  (TR1/2) nodules.   No neck complaints. No hypothyroid or hyperthyroid complaints.   Reports increase in GERD and was taking a lot of Tums which caused hypercalcemia. She took 3 Tums this morning.  [Hypoglycemia] : not hypoglycemic

## 2025-07-26 NOTE — PHYSICAL EXAM
[Alert] : alert [No Acute Distress] : no acute distress [Normal Sclera/Conjunctiva] : normal sclera/conjunctiva [EOMI] : extra ocular movement intact [No LAD] : no lymphadenopathy [Thyroid Not Enlarged] : the thyroid was not enlarged [No Respiratory Distress] : no respiratory distress [Clear to Auscultation] : lungs were clear to auscultation bilaterally [Normal S1, S2] : normal S1 and S2 [Regular Rhythm] : with a regular rhythm [No Edema] : no peripheral edema [Normal Bowel Sounds] : normal bowel sounds [Not Tender] : non-tender [Not Distended] : not distended [Soft] : abdomen soft [Normal Anterior Cervical Nodes] : no anterior cervical lymphadenopathy [No Clubbing, Cyanosis] : no clubbing  or cyanosis of the fingernails [No Rash] : no rash [Normal Reflexes] : deep tendon reflexes were 2+ and symmetric [Normal Affect] : the affect was normal [Normal Mood] : the mood was normal [de-identified] : last foot exam in June 2024 and sees podiatry [Kyphosis] : no kyphosis present [Right foot was examined, including] : right foot ~C was examined, including visual inspection with sensory and pulse exams [Left foot was examined, including] : left foot ~C was examined, including visual inspection with sensory and pulse exams [Normal] : normal [2+] : 2+ in the dorsalis pedis [Diminished Throughout Both Feet] : normal tactile sensation with monofilament testing throughout both feet

## 2025-07-26 NOTE — HISTORY OF PRESENT ILLNESS
[FreeTextEntry1] : 71 y.o. female with h/o osteoporosis, Type 2 DM, HTN, hyperlipidemia and thyroid nodules here for follow up visit.   No acute events since the last visit.  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 Had additional surgery.  Did have b/l knee replacement.   In regard to osteoporosis, had DEXA scan in September in 2015 left femoral neck -1.7, total hip -1.3, 1/3 radius 1.3, spine 0.5.   DEXA scan performed in February 2018 shows left femoral neck -2.5 with 9% decrease since 2015, total hip -2.0 with a 7.8% decrease, 1/3 radius is 1.2 which is stable.  DEXA scan performed in January 2020 showed left femoral neck -2.1% with + 7.5% improvement from prior in February 2018, 1/3 radius is 1.8 which is normal and improved from prior.  DEXA scan performed in 12/2022 showed left femoral neck -1.7% with + 10.2% improvement, total hip -1.8 which is stable, 1/3 radius is 1.6 which is normal and stable.  DEXA scan performed in 12/2023 shows left femoral neck -2.3 with 13.3% decrease, total hip -1.8 which is stable, 1/3 radius is 1.7 which is normal and stable.   She was taking Alendronate 70 mg po weekly from 2/2018 until 12/2022. Had 3 teeth extracted in April 2019 so Alendronate was on hold from April 2019 and resumed back in July 2019. Had another tooth extraction in December 2019. Stated that she held Fosamax for 2 weeks prior to the extraction and then resumed again. She was on a drug holiday since 12/2022 until 12/2023.   She restarted Alendronate 70 mg po weekly in December 2023. No thigh pain or jaw pain. No exercise. No recent falls or fractures. UTD with dentist and no issues now.   She is getting back pain radiating down left leg.    In regard to Type 2 DM, taking Trulicity 1.5 mg SQ weekly and tolerating. Stopped Metformin 500 mg BID. Not FS monitoring. Weight is stable since last visit. Reports less snacking now.   UTD with ophthalmology (9/12/24). No retinopathy. Had right cataract surgery in June 2018. Had left cataract surgery in 2019. No proteinuria. Does have CKD and sees nephrology. Off ACE-I because of cough and taking Losartan 100 mg daily. No new foot complaints. Sees podiatry.   History of stroke (presented with left sided weakness and slurred speech), diagnosed in 1/2017, was at [facility] on 1/6/17.   In regard to thyroid disease, thyroid ultrasound in February 2017 showed stable left colloid cyst 0.4 cm.  Thyroid ultrasound in January 2020 shows 2 left stable colloid cysts 0.7 cm and 0.5 cm.    Thyroid ultrasound on 7/31/24 showed stable multiple b/l subcentimeter  (TR1/2) nodules.   No neck complaints. No hypothyroid or hyperthyroid complaints.   Reports increase in GERD and was taking a lot of Tums which caused hypercalcemia. She took 3 Tums this morning.  [Hypoglycemia] : not hypoglycemic

## 2025-07-26 NOTE — ASSESSMENT
[Carbohydrate Consistent Diet] : carbohydrate consistent diet [Diabetes Foot Care] : diabetes foot care [Long Term Vascular Complications] : long term vascular complications of diabetes [Bisphosphonate Therapy] : Risks  and benefits of bisphosphonate therapy were  discussed with the patient including gastroesophageal irritation, osteonecrosis of the jaw, and atypical femur fractures, and acute phase reaction [Incretin Mimetic Therapy] : Risks and benefits of incretin mimetic therapy were discussed with the patient including nauseau, pancreatitis and potential risk of medullary thyroid cancer [FreeTextEntry1] : 71 y.o. female with h/o Type 2 DM, HTN, hyperlipidemia, thyroid nodule and osteoporosis.  1. Type 2 DM- Good control with Hba1c of 6.1% this month. Encouraged a carbohydrate consistent diet and exercise. Will remain off Metformin 500 mg BID for now. Will continue Trulicity 1.5 mg SQ weekly. Unable to get Ozempic secondary to insurance. Reviewed risks and benefits of GLP-1 Tara including GI side effects, pancreatitis and MTC. UTD with ophthalmology and follows with podiatry. UTD with urine alb/cr ratio.   2. HTN- BP is at goal and will continue medications including ARB  3. Hyperlipidemia- Lipids are at goal and will continue high intensity statin and Zetia. Recommend low fat diet. She follows with cardiology.  4. Thyroid nodule- Patient is euthyroid. Stable left cysts on thyroid ultrasound in July 2024. Will continue to monitor and will repeat thyroid ultrasound in several years.  5. Osteoporosis- DEXA scan performed in 12/2023 shows left femoral neck -2.3 with 13.3% decrease, total hip -1.8 which is stable, 1/3 radius is 1.7 which is normal and stable. Completed one-year drug holiday. Given increased risk of fracture will continue Alendronate 70 mg po weekly. Encouraged weight bearing activity. Discussed appropriate calcium and vitamin D intake. Discussed importance of dental care. Will repeat DEXA in 12/2025.   6. Hypercalcemia- Serum calcium elevated with suppressed intact PTH this month. Suspect related to increased intake of Tums. Recommend stopping Tums and any calcium supplements. Encouraged use of famotidine for GERD. Recommend follow up with PCP and/or GI if GERD does not improve or worsens. Encouraged hydration. Will check BMP today.    Follow up in 6 months.  .